# Patient Record
Sex: FEMALE | Race: WHITE | NOT HISPANIC OR LATINO | Employment: FULL TIME | URBAN - METROPOLITAN AREA
[De-identification: names, ages, dates, MRNs, and addresses within clinical notes are randomized per-mention and may not be internally consistent; named-entity substitution may affect disease eponyms.]

---

## 2020-08-24 ENCOUNTER — OFFICE VISIT (OUTPATIENT)
Dept: SURGERY | Facility: CLINIC | Age: 30
End: 2020-08-24

## 2020-08-24 VITALS
DIASTOLIC BLOOD PRESSURE: 77 MMHG | HEART RATE: 70 BPM | BODY MASS INDEX: 37.89 KG/M2 | SYSTOLIC BLOOD PRESSURE: 116 MMHG | WEIGHT: 193 LBS | OXYGEN SATURATION: 98 % | HEIGHT: 60 IN | TEMPERATURE: 98 F

## 2020-08-24 DIAGNOSIS — N63.0 LUMP OR MASS IN BREAST: Primary | ICD-10-CM

## 2020-08-24 PROCEDURE — 99203 OFFICE O/P NEW LOW 30 MIN: CPT | Performed by: SURGERY

## 2020-08-24 RX ORDER — LEVOTHYROXINE SODIUM 0.05 MG/1
50 TABLET ORAL DAILY
COMMUNITY
Start: 2020-02-25 | End: 2022-01-30

## 2020-08-24 RX ORDER — ATENOLOL 25 MG/1
TABLET ORAL
COMMUNITY
Start: 2014-06-13 | End: 2022-01-30

## 2020-08-24 RX ORDER — CITALOPRAM 40 MG/1
40 TABLET ORAL DAILY
COMMUNITY
Start: 2020-02-25 | End: 2022-01-30

## 2020-08-24 NOTE — PROGRESS NOTES
"Subjective   Hang CHEEK is a 29 y.o. female.   Chief Complaint   Patient presents with   • Mass     left breast mass     /77   Pulse 70   Temp 98 °F (36.7 °C) (Temporal)   Ht 152.4 cm (60\")   Wt 87.5 kg (193 lb)   SpO2 98%   BMI 37.69 kg/m²     HISTORY OF PRESENT ILLNESS:  Patient is a very pleasant 29-year-old female.  She got breast implants from Dr. Estevez about 8 years ago.  She noticed a fullness in the left upper inner quadrant that is unchanged and not painful about a year ago.  She got bilateral mammogram and left breast ultrasound which were negative.  There was some question of intracapsular rupture.  She gets regular periods.  She is not on any hormone replacement therapy.  Denies any family history of breast cancer.      The following portions of the patient's history were reviewed and updated as appropriate: allergies, current medications, past family history, past medical history, past social history, past surgical history and problem list.    Review of Systems   Constitutional: Negative for activity change and diaphoresis.   HENT: Negative.  Negative for sore throat and voice change.    Eyes: Negative for blurred vision.   Respiratory: Negative for wheezing.    Cardiovascular: Negative for chest pain.   Endocrine: Negative.  Negative for polyuria.   Genitourinary: Negative for urinary incontinence.   Musculoskeletal: Negative for arthralgias.   Skin: Negative for color change.   Neurological: Negative for dizziness, speech difficulty and confusion.   Hematological: Does not bruise/bleed easily.   Psychiatric/Behavioral: Negative for agitation.       Objective   Physical Exam   Constitutional: She is oriented to person, place, and time. She appears well-developed and well-nourished.   HENT:   Head: Normocephalic and atraumatic.   Eyes: EOM are normal.   Neck: Normal range of motion.   Cardiovascular: Normal rate.   Pulmonary/Chest: Effort normal.   Bilateral breast implants without " abscess or contracture.  There is a fullness in the upper inner quadrant of the left breast.  No nipple discharge.  No axillary or supraclavicular lymphadenopathy.   Abdominal: Soft.   Musculoskeletal: Normal range of motion.   Neurological: She is alert and oriented to person, place, and time.   Skin: Skin is warm and dry.   Psychiatric: She has a normal mood and affect.         Assessment/Plan   Hang was seen today for mass.    Diagnoses and all orders for this visit:    Lump or mass in breast    Check breast MRI to rule out implant rupture or other.  Follow-up with me after that.  If this is negative will refer her back to her plastic surgeon.    Radha Read MD  8/24/2020  12:12 PM

## 2020-10-01 ENCOUNTER — TELEPHONE (OUTPATIENT)
Dept: SURGERY | Facility: CLINIC | Age: 30
End: 2020-10-01

## 2020-10-01 NOTE — TELEPHONE ENCOUNTER
Have spoken with patient, she stated she would contact her PCP for medication prior to MRI, she is now refusing MRI. Have canceled in system.

## 2021-05-20 ENCOUNTER — OFFICE (OUTPATIENT)
Dept: URBAN - METROPOLITAN AREA CLINIC 64 | Facility: CLINIC | Age: 31
End: 2021-05-20
Payer: COMMERCIAL

## 2021-05-20 VITALS
HEART RATE: 75 BPM | DIASTOLIC BLOOD PRESSURE: 72 MMHG | SYSTOLIC BLOOD PRESSURE: 122 MMHG | HEIGHT: 60 IN | WEIGHT: 201 LBS

## 2021-05-20 DIAGNOSIS — K59.00 CONSTIPATION, UNSPECIFIED: ICD-10-CM

## 2021-05-20 DIAGNOSIS — K62.5 HEMORRHAGE OF ANUS AND RECTUM: ICD-10-CM

## 2021-05-20 DIAGNOSIS — R10.11 RIGHT UPPER QUADRANT PAIN: ICD-10-CM

## 2021-05-20 DIAGNOSIS — R74.01 ELEVATION OF LEVELS OF LIVER TRANSAMINASE LEVELS: ICD-10-CM

## 2021-05-20 PROCEDURE — 99203 OFFICE O/P NEW LOW 30 MIN: CPT | Performed by: NURSE PRACTITIONER

## 2021-06-24 ENCOUNTER — OFFICE (OUTPATIENT)
Dept: URBAN - METROPOLITAN AREA CLINIC 64 | Facility: CLINIC | Age: 31
End: 2021-06-24
Payer: COMMERCIAL

## 2021-06-24 VITALS
DIASTOLIC BLOOD PRESSURE: 76 MMHG | SYSTOLIC BLOOD PRESSURE: 118 MMHG | HEART RATE: 68 BPM | HEIGHT: 60 IN | WEIGHT: 199 LBS

## 2021-06-24 DIAGNOSIS — K59.00 CONSTIPATION, UNSPECIFIED: ICD-10-CM

## 2021-06-24 DIAGNOSIS — R74.01 ELEVATION OF LEVELS OF LIVER TRANSAMINASE LEVELS: ICD-10-CM

## 2021-06-24 DIAGNOSIS — R79.89 OTHER SPECIFIED ABNORMAL FINDINGS OF BLOOD CHEMISTRY: ICD-10-CM

## 2021-06-24 PROCEDURE — 99213 OFFICE O/P EST LOW 20 MIN: CPT | Performed by: NURSE PRACTITIONER

## 2022-05-02 ENCOUNTER — TELEPHONE (OUTPATIENT)
Dept: FAMILY MEDICINE CLINIC | Facility: TELEHEALTH | Age: 32
End: 2022-05-02

## 2022-07-29 PROCEDURE — 82962 GLUCOSE BLOOD TEST: CPT

## 2022-07-29 PROCEDURE — U0004 COV-19 TEST NON-CDC HGH THRU: HCPCS

## 2024-10-25 ENCOUNTER — OFFICE (OUTPATIENT)
Age: 34
End: 2024-10-25
Payer: COMMERCIAL

## 2024-10-25 ENCOUNTER — OFFICE (OUTPATIENT)
Dept: URBAN - METROPOLITAN AREA CLINIC 64 | Facility: CLINIC | Age: 34
End: 2024-10-25
Payer: COMMERCIAL

## 2024-10-25 VITALS
HEART RATE: 85 BPM | WEIGHT: 202 LBS | HEART RATE: 85 BPM | HEIGHT: 60 IN | DIASTOLIC BLOOD PRESSURE: 84 MMHG | WEIGHT: 202 LBS | DIASTOLIC BLOOD PRESSURE: 84 MMHG | HEART RATE: 85 BPM | SYSTOLIC BLOOD PRESSURE: 121 MMHG | DIASTOLIC BLOOD PRESSURE: 84 MMHG | HEART RATE: 85 BPM | WEIGHT: 202 LBS | HEIGHT: 60 IN | SYSTOLIC BLOOD PRESSURE: 121 MMHG | SYSTOLIC BLOOD PRESSURE: 121 MMHG | HEIGHT: 60 IN | HEART RATE: 85 BPM | HEART RATE: 85 BPM | WEIGHT: 202 LBS | SYSTOLIC BLOOD PRESSURE: 121 MMHG | HEIGHT: 60 IN | HEIGHT: 60 IN | HEIGHT: 60 IN | WEIGHT: 202 LBS | DIASTOLIC BLOOD PRESSURE: 84 MMHG | DIASTOLIC BLOOD PRESSURE: 84 MMHG | SYSTOLIC BLOOD PRESSURE: 121 MMHG | HEIGHT: 60 IN | SYSTOLIC BLOOD PRESSURE: 121 MMHG | DIASTOLIC BLOOD PRESSURE: 84 MMHG | DIASTOLIC BLOOD PRESSURE: 84 MMHG | WEIGHT: 202 LBS | HEART RATE: 85 BPM | WEIGHT: 202 LBS | SYSTOLIC BLOOD PRESSURE: 121 MMHG

## 2024-10-25 DIAGNOSIS — K64.8 OTHER HEMORRHOIDS: ICD-10-CM

## 2024-10-25 DIAGNOSIS — C55 MALIGNANT NEOPLASM OF UTERUS, PART UNSPECIFIED: ICD-10-CM

## 2024-10-25 DIAGNOSIS — K59.00 CONSTIPATION, UNSPECIFIED: ICD-10-CM

## 2024-10-25 DIAGNOSIS — K62.5 HEMORRHAGE OF ANUS AND RECTUM: ICD-10-CM

## 2024-10-25 DIAGNOSIS — R74.8 ABNORMAL LEVELS OF OTHER SERUM ENZYMES: ICD-10-CM

## 2024-10-25 PROCEDURE — 99214 OFFICE O/P EST MOD 30 MIN: CPT

## 2024-10-25 RX ORDER — SORBITOL SOLUTION 70 %
SOLUTION, ORAL MISCELLANEOUS
Qty: 100 | Refills: 0 | Status: ACTIVE
Start: 2024-10-25

## 2024-10-25 RX ORDER — LUBIPROSTONE 24 UG/1
48 CAPSULE, GELATIN COATED ORAL
Qty: 60 | Refills: 5 | Status: ACTIVE
Start: 2024-10-25

## 2025-05-19 ENCOUNTER — TRANSCRIBE ORDERS (OUTPATIENT)
Dept: ADMINISTRATIVE | Facility: HOSPITAL | Age: 35
End: 2025-05-19
Payer: MEDICAID

## 2025-05-19 DIAGNOSIS — R79.89 ELEVATED LFTS: Primary | ICD-10-CM
